# Patient Record
Sex: FEMALE | Employment: FULL TIME | URBAN - METROPOLITAN AREA
[De-identification: names, ages, dates, MRNs, and addresses within clinical notes are randomized per-mention and may not be internally consistent; named-entity substitution may affect disease eponyms.]

---

## 2018-06-05 ENCOUNTER — OFFICE VISIT (OUTPATIENT)
Dept: OBGYN CLINIC | Age: 48
End: 2018-06-05
Payer: MEDICARE

## 2018-06-05 ENCOUNTER — HOSPITAL ENCOUNTER (OUTPATIENT)
Age: 48
Setting detail: SPECIMEN
Discharge: HOME | End: 2018-06-05
Payer: MEDICARE

## 2018-06-05 VITALS
DIASTOLIC BLOOD PRESSURE: 74 MMHG | HEART RATE: 80 BPM | HEIGHT: 63 IN | WEIGHT: 118 LBS | SYSTOLIC BLOOD PRESSURE: 128 MMHG | BODY MASS INDEX: 20.91 KG/M2

## 2018-06-05 DIAGNOSIS — N93.9 ABNORMAL UTERINE BLEEDING: ICD-10-CM

## 2018-06-05 DIAGNOSIS — Z01.419 WOMEN'S ANNUAL ROUTINE GYNECOLOGICAL EXAMINATION: Primary | ICD-10-CM

## 2018-06-05 DIAGNOSIS — N89.8 VAGINAL ITCHING: ICD-10-CM

## 2018-06-05 DIAGNOSIS — Z12.31 ENCOUNTER FOR SCREENING MAMMOGRAM FOR BREAST CANCER: ICD-10-CM

## 2018-06-05 LAB
DIRECT EXAM: NORMAL
ESTRADIOL LEVEL: 78 PG/ML (ref 27–314)
FOLLICLE STIMULATING HORMONE: 13.6 U/L (ref 1.7–21.5)
Lab: NORMAL
PROLACTIN: 13.25 UG/L (ref 4.79–23.3)
SPECIMEN DESCRIPTION: NORMAL
STATUS: NORMAL
TSH SERPL DL<=0.05 MIU/L-ACNC: 1.6 MIU/L (ref 0.3–5)

## 2018-06-05 PROCEDURE — 99386 PREV VISIT NEW AGE 40-64: CPT | Performed by: OBSTETRICS & GYNECOLOGY

## 2018-06-05 ASSESSMENT — ENCOUNTER SYMPTOMS
BACK PAIN: 0
EYE PAIN: 0
ABDOMINAL PAIN: 0
SHORTNESS OF BREATH: 0
BLURRED VISION: 0
COUGH: 0

## 2018-06-07 LAB
HPV SAMPLE: ABNORMAL
HPV SOURCE: ABNORMAL
HPV, GENOTYPE 16: NOT DETECTED
HPV, GENOTYPE 18: NOT DETECTED
HPV, HIGH RISK OTHER: DETECTED
HPV, INTERPRETATION: ABNORMAL

## 2018-06-26 LAB — CYTOLOGY REPORT: NORMAL

## 2018-08-21 ENCOUNTER — HOSPITAL ENCOUNTER (OUTPATIENT)
Age: 48
Setting detail: SPECIMEN
Discharge: HOME | End: 2018-08-21
Payer: MEDICARE

## 2018-08-21 ENCOUNTER — TELEPHONE (OUTPATIENT)
Dept: OBGYN CLINIC | Age: 48
End: 2018-08-21

## 2018-08-21 LAB
-: NORMAL
AMORPHOUS: NORMAL
BACTERIA: NORMAL
BILIRUBIN URINE: NEGATIVE
CASTS UA: NORMAL /LPF (ref 0–8)
COLOR: YELLOW
COMMENT UA: ABNORMAL
CRYSTALS, UA: NORMAL /HPF
EPITHELIAL CELLS UA: NORMAL /HPF (ref 0–5)
GLUCOSE URINE: NEGATIVE
KETONES, URINE: NEGATIVE
LEUKOCYTE ESTERASE, URINE: NEGATIVE
MUCUS: NORMAL
NITRITE, URINE: NEGATIVE
OTHER OBSERVATIONS UA: NORMAL
PH UA: 5.5 (ref 5–8)
PROTEIN UA: NEGATIVE
RBC UA: NORMAL /HPF (ref 0–4)
RENAL EPITHELIAL, UA: NORMAL /HPF
SPECIFIC GRAVITY UA: 1.02 (ref 1–1.03)
TRICHOMONAS: NORMAL
TURBIDITY: CLEAR
URINE HGB: ABNORMAL
UROBILINOGEN, URINE: NORMAL
WBC UA: NORMAL /HPF (ref 0–5)
YEAST: NORMAL

## 2018-08-21 NOTE — TELEPHONE ENCOUNTER
Charisse Barnes with [facility] called requesting last office notes for the patient be faxed. Fax # [phone].

## 2018-09-13 LAB — CYTOLOGY REPORT: NORMAL

## 2024-04-03 ENCOUNTER — HOSPITAL ENCOUNTER (OUTPATIENT)
Age: 54
Setting detail: THERAPIES SERIES
Discharge: HOME | End: 2024-04-03
Payer: COMMERCIAL

## 2024-04-03 PROCEDURE — 97110 THERAPEUTIC EXERCISES: CPT

## 2024-04-03 PROCEDURE — 97161 PT EVAL LOW COMPLEX 20 MIN: CPT

## 2024-04-03 NOTE — CONSULTS
[x] [facility]  Outpatient Physical Therapy  [address] [facility] [address]  Phone: [phone]  Fax: [phone]       Physical Therapy Evaluation    Date:  4/3/2024  Patient: Skip Lynne  : 1970  MRN: [id number]  Physician: Og Reilly MD     Insurance: [employer]   Medical Diagnosis: Cervicalgia    Rehab Codes: M54.2   M25.60  Onset Date: 2024                                 Next 's appt: None Scheduled    Subjective:   HPI: Pt reports insidious onset of Lower Cervical Pain and Stiffness 2 months ago. She states her symptoms present about a half hour after starting work and worsen through the day. She states the pain is located In her Lower Cervical Spine. She denies UE pain or numbness/tingling. She denies UE weakness. She denies effect with coughing or sneezing. She works as a  8 hours per day 6 days per week. She states she spends most of the day looking down.     PMHx/Comorbidities:  [x] refer to full medical chart in [facility] [] Unremarkable    [] Pacemaker [] Cancer [] Arthritis   [] MI/Heart Problems [] Diabetes [] HTN   [] Obesity [] Dialysis  [] Other:   [] Asthma/COPD [] Dementia [] Other:   [] Stroke [] Sleep apnea [] Other:   [] Vascular disease [] Rheumatic disease [] Other:     Medications: [x] Refer to full medical record [x] None   Allergies:      [x] Refer to full medical record  [x] None     Tests: [x] X-Ray: Cervical Degenerative Disc Disease per patient.     Function:  Pt denies difficulty accessing all levels of her home.  Patient lives with:  Lives with 12 year old and 17 year old children   In what type of home []  One story   [] Two story   [] Split level   Number of stairs to enter    With handrail on the []  Right to enter   [] Left to enter   Bathroom has a []  Tub only  [] Tub/shower combo   [] Walk in shower    []  Grab bars   Washing machine is on []  Main level   [] Second level   [] Basement     ADL/IADL [x] Previously independent with all

## 2024-04-08 ENCOUNTER — HOSPITAL ENCOUNTER (OUTPATIENT)
Age: 54
Setting detail: THERAPIES SERIES
Discharge: HOME | End: 2024-04-08
Payer: COMMERCIAL

## 2024-04-08 NOTE — FLOWSHEET NOTE
[x] [facility]  Outpatient Rehabilitation &  Therapy  [address] [place] [address]  [phone]  [phone]              Therapy Cancel/No Show note    Date: 2024  Patient: Skip Lynne  : 1970  MRN: [id number]    Cancels/No Shows to date:     For today's appointment patient:    [x]  Cancelled    [] Rescheduled appointment    [] No-show     Reason given by patient:    []  Patient ill    []  Conflicting appointment    [] No transportation      [] Conflict with work    [] No reason given    [] Weather related    [] COVID-19    [x] Other:      Comments:  Canceled secondary to still awaiting Insurance Approval.      [x] Next appointment was confirmed    Electronically signed by: Francisco Rouse, PT

## 2024-04-10 ENCOUNTER — HOSPITAL ENCOUNTER (OUTPATIENT)
Age: 54
Setting detail: THERAPIES SERIES
Discharge: HOME | End: 2024-04-10
Payer: COMMERCIAL

## 2024-04-10 PROCEDURE — 97110 THERAPEUTIC EXERCISES: CPT

## 2024-04-10 PROCEDURE — 97140 MANUAL THERAPY 1/> REGIONS: CPT

## 2024-04-10 NOTE — FLOWSHEET NOTE
[x] [facility]  Outpatient Rehabilitation &  Therapy  [address] [place] [address]  [phone]  F: [phone]             Physical Therapy Daily Treatment Note    Date:  4/10/2024  Patient Name:  Skip Lynne    :  1970  MRN: [id number]  Physician: Og Reilly MD        Insurance: [employer]   Medical Diagnosis: Cervicalgia                      Rehab Codes: M54.2   M25.60  Onset Date: 2024                                 Next 's appt: None Scheduled  Visit# / total visits: ; Progress note for patient due at visit 12     Cancels/No Shows: 0/0    Subjective:  Pt reports no adverse response to previous Therapy session. She states the medicine prescribed by the referring, Cyclobenzaprine, does improve her pain but does make her sleepy. She states she has been performing her HEP as instructed and attempting to correct her posture while working.   Pain:  [] Yes  [x] No Location:  N/A  Pain Rating: (0-10 scale) 0/10  Pain altered Tx:  [] No  [] Yes  Action:  Comments:    Objective:   Standing Posture: Decreased Cervical Lordosis. Right Scapula Elevated and Protracted.  Cervical AROM: Flexion 50 degrees with Lower Cervical \"tightness\", Extension 50 degrees with \"soreness\", Lateral Flexion Right 30 degrees with \"tightness\" and \"soreness\" on Left and Left 30 degrees with \"tightness\" and \"soreness\" on Right, and Rotation Right 65 degrees with \"tightness\" and \"soreness\" and Left 65 degrees with \"tightness\" and \"soreness\". Stiffness noted in Lower Cervical Spine vs Upper Cervical Spine with movements.  Loaded Cervical Retractions 3x10 Reps with no effect.  Cervical Passive ROM: Lateral Flexion Right with pain initially in Lower Cervical Spine Left with pain resolving as technique was continued and Left with no effect, Rotation Right and Left with no effect.  Manual Cervical Traction x5 Minutes with no effect.  Unloaded Cervical Retractions 3x10 Reps Manually with Head off the

## 2024-04-12 ENCOUNTER — HOSPITAL ENCOUNTER (OUTPATIENT)
Age: 54
Setting detail: THERAPIES SERIES
Discharge: HOME | End: 2024-04-12
Payer: COMMERCIAL

## 2024-04-12 PROCEDURE — 97140 MANUAL THERAPY 1/> REGIONS: CPT

## 2024-04-12 PROCEDURE — 97110 THERAPEUTIC EXERCISES: CPT

## 2024-04-12 NOTE — FLOWSHEET NOTE
[x] [facility]  Outpatient Rehabilitation &  Therapy  [address] [place] [address]  [phone]  F: [phone]             Physical Therapy Daily Treatment Note    Date:  2024  Patient Name:  Skip Lynne    :  1970  MRN: [id number]  Physician: Og Reilly MD        Insurance: [employer]   Medical Diagnosis: Cervicalgia                      Rehab Codes: M54.2   M25.60  Onset Date: 2024                                 Next 's appt: None Scheduled  Visit# / total visits: ; Progress note for patient due at visit 12     Cancels/No Shows: 0/0    Subjective:  Pt reports no adverse response to previous Therapy session. She states she is taking the Cyclobenzaprine before bedtime. She states she \"felt good\" for the remainder of the day. She states yesterday she worked all day with increased pain noted as a result. She states her neck \"hurts\" this morning also. She states she has been performing her HEP as instructed and attempting to correct her posture while working.   Pain:  [] Yes  [x] No Location:  Central Cervical Spine  Pain Rating: (0-10 scale) 5/10  Pain altered Tx:  [x] No  [] Yes  Action: As Below  Comments:    Objective:   Standing Posture: Decreased Cervical Lordosis. Right Scapula Elevated and Protracted.  Cervical AROM: Flexion 50 degrees with Lower Cervical \"tightness\", Extension 50 degrees with \"soreness\", Lateral Flexion Right 30 degrees with \"tightness\" and \"soreness\" on Left and Left 30 degrees with \"tightness\" and \"soreness\" on Right, and Rotation Right 65 degrees with \"tightness\" and \"soreness\" and Left 65 degrees with \"tightness\" and \"soreness\". Stiffness noted in Lower Cervical Spine vs Upper Cervical Spine with movements.  Loaded Cervical Retractions 3x10 Reps with no effect.  Cervical Passive ROM: Lateral Flexion Right with pain initially in Lower Cervical Spine Left with pain resolving as technique was continued and Left with no effect, Rotation

## 2024-04-15 ENCOUNTER — HOSPITAL ENCOUNTER (OUTPATIENT)
Age: 54
Setting detail: THERAPIES SERIES
Discharge: HOME | End: 2024-04-15
Payer: COMMERCIAL

## 2024-04-15 PROCEDURE — 97140 MANUAL THERAPY 1/> REGIONS: CPT

## 2024-04-15 PROCEDURE — 97110 THERAPEUTIC EXERCISES: CPT

## 2024-04-15 NOTE — FLOWSHEET NOTE
[x] Verbal  [x] Demo  [x] Written  Comprehension of Education:  [x] Verbalizes understanding.  [x] Demonstrates understanding.  [x] Needs Review.  [] Demonstrates/verbalizes understanding of HEP/Ed previously given.    Plan: [x] Continue current frequency toward long and short term goals.    [x] Specific Instructions for subsequent treatments: Progress Manual Therapy and Cervical Retractions. Review Postural Corrections.      Time In:0715            Time Out: 0800    Electronically signed by:  Francisco Rouse PT

## 2024-04-17 ENCOUNTER — HOSPITAL ENCOUNTER (OUTPATIENT)
Age: 54
Setting detail: THERAPIES SERIES
Discharge: HOME | End: 2024-04-17
Payer: COMMERCIAL

## 2024-04-17 PROCEDURE — 97110 THERAPEUTIC EXERCISES: CPT

## 2024-04-17 PROCEDURE — 97140 MANUAL THERAPY 1/> REGIONS: CPT

## 2024-04-17 NOTE — FLOWSHEET NOTE
[x] [facility]  Outpatient Rehabilitation &  Therapy  [address] [place] [address]  [phone]  F: [phone]             Physical Therapy Daily Treatment Note    Date:  2024  Patient Name:  Skip Lynne    :  1970  MRN: [id number]  Physician: Og Reilly MD        Insurance: [employer]   Medical Diagnosis: Cervicalgia                      Rehab Codes: M54.2   M25.60  Onset Date: 2024                                 Next 's appt: None Scheduled  Visit# / total visits: ; Progress note for patient due at visit 12     Cancels/No Shows: 0/0    Subjective:  Pt reports no adverse response to previous Therapy session. She states she \"felt better\" for the remainder of the day. She notes no pain this morning only \"tightness\". She states she was able to work yesterday without pain only \"tightness\".  Pain:  [] Yes  [x] No Location:  Central Cervical Spine  Pain Rating: (0-10 scale) 0/10  Pain altered Tx:  [x] No  [] Yes  Action: As Below  Comments:    Objective:   Standing Posture: Decreased Cervical Lordosis. Right Scapula Elevated and Protracted.  Cervical AROM: Flexion 50 degrees with Lower Cervical \"tightness\", Extension 50 degrees with \"soreness\", Lateral Flexion Right 30 degrees with \"tightness\" and \"soreness\" on Left and Left 30 degrees with \"tightness\" and \"soreness\" on Right, and Rotation Right 65 degrees with \"tightness\" and \"soreness\" and Left 65 degrees with \"tightness\" and \"soreness\". Stiffness noted in Lower Cervical Spine vs Upper Cervical Spine with movements.  Loaded Cervical Retractions 3x10 Reps with no effect.  Cervical Passive ROM: Lateral Flexion Right with pain initially in Lower Cervical Spine Left with pain resolving as technique was continued and Left with no effect, Rotation Right and Left with no effect.  Manual Cervical Traction x5 Minutes with no effect.  Unloaded Cervical Retractions 3x10 Reps Manually with Head off the plinth.   Grade

## 2024-04-19 ENCOUNTER — HOSPITAL ENCOUNTER (OUTPATIENT)
Age: 54
Setting detail: THERAPIES SERIES
Discharge: HOME | End: 2024-04-19
Payer: COMMERCIAL

## 2024-04-19 PROCEDURE — 97140 MANUAL THERAPY 1/> REGIONS: CPT

## 2024-04-19 PROCEDURE — 97110 THERAPEUTIC EXERCISES: CPT

## 2024-04-19 NOTE — FLOWSHEET NOTE
with pt reporting pain at C3-C5.  NPDIQ Score: 10/50  Above recorded at Initial Evaluation on April 03, 2024.     Modalities: None  Precautions: None  Exercises:  Exercise Reps/ Time Weight/ Level Comments   Towel Stretch For Rotation  3x5 Seconds           Loaded Cervical Retractions  3x10    Manual   No Pain              C2-C6 And C1 UPAs  3x15 Seconds  Grade IV  No Pain And No Change In \"Tightness\" With AROM             Passive Cervical Lateral Flexion  3x15 Seconds Each  Grade IV  No Pain  ROM Less Tight Afterwards             Stretch To Cervical Lateral Flexors  3x10 Seconds Each           Passive Cervical Rotation  3x15 Seconds Each  Grade IV  No Pain  ROM Less Tight Afterwards             Cervical Isometrics  5x10 Seconds           Unloaded Cervical Retractions Manually                   Manual Cervical Traction  8 Minutes    No Pain  Improved AROM Afterwards             STM Bilat Cervical Paraspinals  5 Minutes     Other:      Treatment Charges: Mins Units   []  Modalities     [x]  Ther Exercise 15 1   [x]  Manual Therapy 30 2   []  Ther Activities     []  Neuro Re-ed     []  Vasocompression     [] Gait     []  Other     Total Billable time 3 45       Assessment: Pt presents reporting improved symptoms for the remainder of the day after treatment. She reports no pain this morning only tightness. She states she did experience significant \"tightness\" yesterday while working secondary to not having the time to perform her HEP. No Goals Met.    STG: (to be met in 12 treatments)  Improve Lower Cervical Pain to no greater than 2/10 to facilitate Looking Down and Working.  Improve Pt's ability to work with pt reporting she is able to work for one hour before onset of her Lower Cervical Pain.  Improve Seated Postural Awareness to improve pt's ability to work without pain.  Independent with her Home Exercise Program.     LTG: (to be met in 24 treatments)  Improve NPDIQ Score from 10/50 to 05/50 to facilitate

## 2024-04-24 ENCOUNTER — HOSPITAL ENCOUNTER (OUTPATIENT)
Age: 54
Setting detail: THERAPIES SERIES
Discharge: HOME | End: 2024-04-24
Payer: COMMERCIAL

## 2024-04-24 PROCEDURE — 97140 MANUAL THERAPY 1/> REGIONS: CPT

## 2024-04-24 PROCEDURE — 97110 THERAPEUTIC EXERCISES: CPT

## 2024-04-24 NOTE — FLOWSHEET NOTE
[x] [facility]  Outpatient Rehabilitation &  Therapy  [address] [place] [address]  [phone]  F: [phone]             Physical Therapy Daily Treatment Note    Date:  2024  Patient Name:  Skip Lynne    :  1970  MRN: [id number]  Physician: Og Reilly MD        Insurance: [employer]   Medical Diagnosis: Cervicalgia                      Rehab Codes: M54.2   M25.60  Onset Date: 2024                                 Next 's appt: None Scheduled  Visit# / total visits: ; Progress note for patient due at visit 12     Cancels/No Shows: 0/0    Subjective:  Pt reports no adverse response to previous Therapy session. She states she exercises a lot yesterday with decreased \"tightness\" noted this morning. She notes increased tightness and pain with Rotation Right > Left this morning. She states she did not perform her new Towel Stretch given the previous session secondary to not understanding how to do it correctly.  Pain:  [] Yes  [x] No Location:  Central Cervical Spine  Pain Rating: (0-10 scale) 0/10  Pain altered Tx:  [x] No  [] Yes  Action: As Below  Comments:    Objective:   Standing Posture: Decreased Cervical Lordosis. Right Scapula Elevated and Protracted.  Cervical AROM: Flexion 50 degrees with Lower Cervical \"tightness\", Extension 50 degrees with \"soreness\", Lateral Flexion Right 30 degrees with \"tightness\" and \"soreness\" on Left and Left 30 degrees with \"tightness\" and \"soreness\" on Right, and Rotation Right 65 degrees with \"tightness\" and \"soreness\" and Left 65 degrees with \"tightness\" and \"soreness\". Stiffness noted in Lower Cervical Spine vs Upper Cervical Spine with movements.  Loaded Cervical Retractions 3x10 Reps with no effect.  Cervical Passive ROM: Lateral Flexion Right with pain initially in Lower Cervical Spine Left with pain resolving as technique was continued and Left with no effect, Rotation Right and Left with no effect.  Manual Cervical

## 2024-04-29 ENCOUNTER — HOSPITAL ENCOUNTER (OUTPATIENT)
Age: 54
Setting detail: THERAPIES SERIES
Discharge: HOME | End: 2024-04-29
Payer: COMMERCIAL

## 2024-04-29 PROCEDURE — 97140 MANUAL THERAPY 1/> REGIONS: CPT

## 2024-04-29 PROCEDURE — 97110 THERAPEUTIC EXERCISES: CPT

## 2024-04-29 NOTE — FLOWSHEET NOTE
in 24 treatments)  Improve NPDIQ Score from 10/50 to 05/50 to facilitate Looking Down and Working.    Pt. Education:  [x] Plans/Goals, Risks/Benefits discussed  [x] Home exercise program: Reviewed per [employer] #WQADHCQA Add overpressure to Lateral Flexion and Rotation ROM. Add per [employer] #WQADHCQA. #[id number]. Reviewed Isometric Exercise with pt.  Method of Education: [x] Verbal  [x] Demo  [x] Written  Comprehension of Education:  [x] Verbalizes understanding.  [x] Demonstrates understanding.  [x] Needs Review.  [] Demonstrates/verbalizes understanding of HEP/Ed previously given.    Plan: [x] Continue current frequency toward long and short term goals.    [x] Specific Instructions for subsequent treatments: Progress Cervical Isometrics.      Time In:0800            Time Out: 0845    Electronically signed by:  Francisco Rouse PT

## 2024-05-01 ENCOUNTER — HOSPITAL ENCOUNTER (OUTPATIENT)
Age: 54
Setting detail: THERAPIES SERIES
Discharge: HOME | End: 2024-05-01
Payer: COMMERCIAL

## 2024-05-01 PROCEDURE — 97140 MANUAL THERAPY 1/> REGIONS: CPT

## 2024-05-01 PROCEDURE — 97110 THERAPEUTIC EXERCISES: CPT

## 2024-05-01 NOTE — FLOWSHEET NOTE
[x] [facility]  Outpatient Rehabilitation &  Therapy  [address] [place] [address]  [phone]  F: [phone]             Physical Therapy Daily Treatment Note    Date:  2024  Patient Name:  Skip Lynne    :  1970  MRN: [id number]  Physician: Og Reilly MD        Insurance: [employer]   Medical Diagnosis: Cervicalgia                      Rehab Codes: M54.2   M25.60  Onset Date: 2024                                 Next 's appt: None Scheduled  Visit# / total visits: ; Progress note for patient due at visit 12     Cancels/No Shows: 0/0    Subjective:  Pt reports no adverse response to previous Therapy session. She states she did perform the Isometric Exercise as directed and notes she was shaking secondary to fatigue. She notes no pain this morning only \"tightness\".  Pain:  [] Yes  [x] No Location:  Central Cervical Spine  Pain Rating: (0-10 scale) 0/10  Pain altered Tx:  [x] No  [] Yes  Action: As Below  Comments:    Objective:   Standing Posture: Decreased Cervical Lordosis. Right Scapula Elevated and Protracted.  Cervical AROM: Flexion 50 degrees with Lower Cervical \"tightness\", Extension 50 degrees with \"soreness\", Lateral Flexion Right 30 degrees with \"tightness\" and \"soreness\" on Left and Left 30 degrees with \"tightness\" and \"soreness\" on Right, and Rotation Right 65 degrees with \"tightness\" and \"soreness\" and Left 65 degrees with \"tightness\" and \"soreness\". Stiffness noted in Lower Cervical Spine vs Upper Cervical Spine with movements.  Loaded Cervical Retractions 3x10 Reps with no effect.  Cervical Passive ROM: Lateral Flexion Right with pain initially in Lower Cervical Spine Left with pain resolving as technique was continued and Left with no effect, Rotation Right and Left with no effect.  Manual Cervical Traction x5 Minutes with no effect.  Unloaded Cervical Retractions 3x10 Reps Manually with Head off the plinth.   Grade IV x10 Seconds Each

## 2024-05-03 ENCOUNTER — HOSPITAL ENCOUNTER (OUTPATIENT)
Age: 54
Setting detail: THERAPIES SERIES
Discharge: HOME | End: 2024-05-03
Payer: COMMERCIAL

## 2024-05-03 PROCEDURE — 97140 MANUAL THERAPY 1/> REGIONS: CPT

## 2024-05-03 PROCEDURE — 97110 THERAPEUTIC EXERCISES: CPT

## 2024-05-03 NOTE — FLOWSHEET NOTE
[x] [facility]  Outpatient Rehabilitation &  Therapy  [address] [place] [address]  [phone]  F: [phone]             Physical Therapy Daily Treatment Note    Date:  5/3/2024  Patient Name:  Skip Lynne    :  1970  MRN: [id number]  Physician: Og Reilly MD        Insurance: [employer]   Medical Diagnosis: Cervicalgia                      Rehab Codes: M54.2   M25.60  Onset Date: 2024                                 Next 's appt: None Scheduled  Visit# / total visits: 10/12; Progress note for patient due at visit 12     Cancels/No Shows: 0/0    Subjective:  Pt reports no adverse response to previous Therapy session. She states she felt good following Therapy, however notes her neck was \"tight\" by the end of the day.  Pain:  [] Yes  [x] No Location:  Central Cervical Spine  Pain Rating: (0-10 scale) 0/10  Pain altered Tx:  [x] No  [] Yes  Action: As Below  Comments:    Objective:   Standing Posture: Decreased Cervical Lordosis. Right Scapula Elevated and Protracted.  Cervical AROM: Flexion 50 degrees with Lower Cervical \"tightness\", Extension 50 degrees with \"soreness\", Lateral Flexion Right 30 degrees with \"tightness\" and \"soreness\" on Left and Left 30 degrees with \"tightness\" and \"soreness\" on Right, and Rotation Right 65 degrees with \"tightness\" and \"soreness\" and Left 65 degrees with \"tightness\" and \"soreness\". Stiffness noted in Lower Cervical Spine vs Upper Cervical Spine with movements.  Loaded Cervical Retractions 3x10 Reps with no effect.  Cervical Passive ROM: Lateral Flexion Right with pain initially in Lower Cervical Spine Left with pain resolving as technique was continued and Left with no effect, Rotation Right and Left with no effect.  Manual Cervical Traction x5 Minutes with no effect.  Unloaded Cervical Retractions 3x10 Reps Manually with Head off the plinth.   Grade IV x10 Seconds Each C2-C6 with pt reporting pain at C3-C5.  NPDIQ Score:

## 2024-05-06 ENCOUNTER — HOSPITAL ENCOUNTER (OUTPATIENT)
Age: 54
Setting detail: THERAPIES SERIES
Discharge: HOME | End: 2024-05-06
Payer: COMMERCIAL

## 2024-05-06 PROCEDURE — 97110 THERAPEUTIC EXERCISES: CPT

## 2024-05-06 PROCEDURE — 97140 MANUAL THERAPY 1/> REGIONS: CPT

## 2024-05-06 NOTE — FLOWSHEET NOTE
[x] [facility]  Outpatient Rehabilitation &  Therapy  [address] [place] [address]  [phone]  F: [phone]             Physical Therapy Daily Treatment Note    Date:  2024  Patient Name:  Skip Lynne    :  1970  MRN: [id number]  Physician: Og Reilly MD        Insurance: [employer]   Medical Diagnosis: Cervicalgia                      Rehab Codes: M54.2   M25.60  Onset Date: 2024                                 Next 's appt: None Scheduled  Visit# / total visits: ; Progress note for patient due at visit 12     Cancels/No Shows: 0/0    Subjective:  Pt reports no adverse response to previous Therapy session. She states she drove for two hours yesterday with her Head feeling \"heavy\". She states she went home a layed down and felt better. She states her neck feels \"weak\".  Pain:  [] Yes  [x] No Location:  Central Cervical Spine  Pain Rating: (0-10 scale) 0/10  Pain altered Tx:  [x] No  [] Yes  Action: As Below  Comments:    Objective:   Standing Posture: Decreased Cervical Lordosis. Right Scapula Elevated and Protracted.  Cervical AROM: Flexion 50 degrees with Lower Cervical \"tightness\", Extension 50 degrees with \"soreness\", Lateral Flexion Right 30 degrees with \"tightness\" and \"soreness\" on Left and Left 30 degrees with \"tightness\" and \"soreness\" on Right, and Rotation Right 65 degrees with \"tightness\" and \"soreness\" and Left 65 degrees with \"tightness\" and \"soreness\". Stiffness noted in Lower Cervical Spine vs Upper Cervical Spine with movements.  Loaded Cervical Retractions 3x10 Reps with no effect.  Cervical Passive ROM: Lateral Flexion Right with pain initially in Lower Cervical Spine Left with pain resolving as technique was continued and Left with no effect, Rotation Right and Left with no effect.  Manual Cervical Traction x5 Minutes with no effect.  Unloaded Cervical Retractions 3x10 Reps Manually with Head off the plinth.   Grade IV x10 Seconds

## 2024-05-08 ENCOUNTER — HOSPITAL ENCOUNTER (OUTPATIENT)
Age: 54
Setting detail: THERAPIES SERIES
Discharge: HOME | End: 2024-05-08
Payer: COMMERCIAL

## 2024-05-08 PROCEDURE — 97110 THERAPEUTIC EXERCISES: CPT

## 2024-05-08 PROCEDURE — 97140 MANUAL THERAPY 1/> REGIONS: CPT

## 2024-05-08 NOTE — FLOWSHEET NOTE
[x] [facility]  Outpatient Rehabilitation &  Therapy  [address] [place] [address]  [phone]  F: [phone]             Physical Therapy Daily Treatment Note/Discharge Note    Date:  2024  Patient Name:  Skip Lynne    :  1970  MRN: [id number]  Physician: Og Reilly MD        Insurance: [employer]   Medical Diagnosis: Cervicalgia                      Rehab Codes: M54.2   M25.60  Onset Date: 2024                                 Next 's appt: None Scheduled  Visit# / total visits: ; Progress note for patient due at visit 12     Cancels/No Shows: 0/0    Subjective:  Pt reports no adverse response to previous Therapy session. She states she had no pain or \"tightness\" until afternoon. She states she continues to note heaviness and tightness the longer the day goes on. She states her symptoms are worse on work days secondary to sitting in one position all day, although notes symptoms even on her days off. She does present this date with Cervical x-rays and an MRI of her Lumbar Spine.  Pain:  [] Yes  [x] No Location:  Central Cervical Spine  Pain Rating: (0-10 scale) 0/10  Pain altered Tx:  [x] No  [] Yes  Action: As Below  Comments:    Objective:   Standing Posture: Decreased Cervical Lordosis. Right Scapula Elevated and Protracted.  Cervical AROM: Flexion 50 degrees with Lower Cervical \"tightness\", Extension 50 degrees with \"soreness\", Lateral Flexion Right 30 degrees with \"tightness\" and \"soreness\" on Left and Left 30 degrees with \"tightness\" and \"soreness\" on Right, and Rotation Right 65 degrees with \"tightness\" and \"soreness\" and Left 65 degrees with \"tightness\" and \"soreness\". Stiffness noted in Lower Cervical Spine vs Upper Cervical Spine with movements.  Loaded Cervical Retractions 3x10 Reps with no effect.  Cervical Passive ROM: Lateral Flexion Right with pain initially in Lower Cervical Spine Left with pain resolving as technique was continued and Left

## 2024-05-10 ENCOUNTER — APPOINTMENT (OUTPATIENT)
Age: 54
End: 2024-05-10
Payer: COMMERCIAL

## 2025-04-23 ENCOUNTER — HOSPITAL ENCOUNTER (OUTPATIENT)
Dept: GENERAL RADIOLOGY | Age: 55
Discharge: HOME | End: 2025-04-25
Payer: COMMERCIAL

## 2025-04-23 ENCOUNTER — OFFICE VISIT (OUTPATIENT)
Dept: NEUROSURGERY | Age: 55
End: 2025-04-23
Payer: COMMERCIAL

## 2025-04-23 VITALS
HEART RATE: 74 BPM | DIASTOLIC BLOOD PRESSURE: 89 MMHG | BODY MASS INDEX: 20.38 KG/M2 | SYSTOLIC BLOOD PRESSURE: 137 MMHG | WEIGHT: 115 LBS | HEIGHT: 63 IN

## 2025-04-23 DIAGNOSIS — M47.816 LUMBAR SPONDYLOSIS: ICD-10-CM

## 2025-04-23 DIAGNOSIS — Z78.0 POST-MENOPAUSAL: Primary | ICD-10-CM

## 2025-04-23 DIAGNOSIS — M47.812 CERVICAL SPONDYLOSIS: ICD-10-CM

## 2025-04-23 PROCEDURE — G8420 CALC BMI NORM PARAMETERS: HCPCS

## 2025-04-23 PROCEDURE — 72050 X-RAY EXAM NECK SPINE 4/5VWS: CPT

## 2025-04-23 PROCEDURE — G8427 DOCREV CUR MEDS BY ELIG CLIN: HCPCS

## 2025-04-23 PROCEDURE — 3017F COLORECTAL CA SCREEN DOC REV: CPT

## 2025-04-23 PROCEDURE — 72110 X-RAY EXAM L-2 SPINE 4/>VWS: CPT

## 2025-04-23 PROCEDURE — 99204 OFFICE O/P NEW MOD 45 MIN: CPT

## 2025-04-23 PROCEDURE — 1036F TOBACCO NON-USER: CPT

## 2025-04-23 RX ORDER — VALACYCLOVIR HYDROCHLORIDE 1 G/1
TABLET, FILM COATED ORAL
COMMUNITY
Start: 2025-03-19

## 2025-04-23 RX ORDER — MULTIVITAMIN
1 TABLET ORAL DAILY
COMMUNITY

## 2025-04-23 RX ORDER — MELOXICAM 7.5 MG/1
7.5 TABLET ORAL DAILY
COMMUNITY

## 2025-04-23 RX ORDER — LIDOCAINE 50 MG/G
1 PATCH TOPICAL DAILY
COMMUNITY

## 2025-04-23 RX ORDER — ACETAMINOPHEN 325 MG/1
650 TABLET ORAL 3 TIMES DAILY
COMMUNITY

## 2025-04-23 NOTE — PROGRESS NOTES
[facility] NEUROSURGERY [place] [id number] [facility]  [id number]  [address] GROUND FLOOR, [address]  [facility] [address]  Dept: [phone]    Patient:  Skip Lynne  YOB: 1970  Date: 4/23/25    The patient is a 55 y.o. female who presents today for consult of the following problems:     Chief Complaint   Patient presents with    New Patient     Neck pain  Upper back pain             HPI:     Skip Lynne is a 55 y.o. female on whom neurosurgical consultation was requested by No primary care provider on file. for management of neck and back pain.    The patient is a 55-year-old female presenting with neck and back pain that began a few years ago and has progressively worsened. Pain is located posteriorly, involving the bilateral shoulders and lower back. It is constant, rated 7/10 in severity, and described as a tight sensation. Pain is aggravated by daily activities and alleviated somewhat by using a massage gun, massage therapy, and applying pressure through a pillow or lumbar support brace. She completed physical therapy in May 2024 and recently participated in additional therapy while in [place], although oral steroids and OTC NSAIDs have provided minimal relief. She reports hearing cracking and grinding in her neck with range of motion but denies weakness. She denies bladder or bowel incontinence. Exercise and yoga are performed daily for symptom management.     Completed physical therapy May 2024    History:     History reviewed. No pertinent past medical history.  Past Surgical History:   Procedure Laterality Date    TUBAL LIGATION       History reviewed. No pertinent family history.  Current Outpatient Medications on File Prior to Visit   Medication Sig Dispense Refill    acetaminophen (TYLENOL) 325 MG tablet Take 2 tablets by mouth 3 times daily      lidocaine (LIDODERM) 5 % Apply 1 patch topically daily      LYCOPENE PO Take 1 tablet by mouth

## 2025-04-25 ENCOUNTER — TELEPHONE (OUTPATIENT)
Dept: PAIN MANAGEMENT | Age: 55
End: 2025-04-25

## 2025-05-19 ENCOUNTER — HOSPITAL ENCOUNTER (OUTPATIENT)
Dept: MAMMOGRAPHY | Age: 55
Discharge: HOME | End: 2025-05-21
Payer: COMMERCIAL

## 2025-05-19 DIAGNOSIS — Z78.0 POST-MENOPAUSAL: ICD-10-CM

## 2025-05-19 PROCEDURE — 77080 DXA BONE DENSITY AXIAL: CPT

## 2025-06-06 ENCOUNTER — INITIAL CONSULT (OUTPATIENT)
Dept: PAIN MANAGEMENT | Age: 55
End: 2025-06-06
Payer: COMMERCIAL

## 2025-06-06 VITALS — HEIGHT: 63 IN | BODY MASS INDEX: 20.38 KG/M2 | WEIGHT: 115 LBS

## 2025-06-06 DIAGNOSIS — M79.18 MYOFASCIAL PAIN SYNDROME: ICD-10-CM

## 2025-06-06 DIAGNOSIS — M47.817 LUMBOSACRAL SPONDYLOSIS WITHOUT MYELOPATHY: Primary | ICD-10-CM

## 2025-06-06 DIAGNOSIS — M51.369 DEGENERATION OF INTERVERTEBRAL DISC OF LUMBAR REGION, UNSPECIFIED WHETHER PAIN PRESENT: ICD-10-CM

## 2025-06-06 DIAGNOSIS — M47.812 CERVICAL SPONDYLOSIS: ICD-10-CM

## 2025-06-06 PROCEDURE — 3017F COLORECTAL CA SCREEN DOC REV: CPT | Performed by: STUDENT IN AN ORGANIZED HEALTH CARE EDUCATION/TRAINING PROGRAM

## 2025-06-06 PROCEDURE — 1036F TOBACCO NON-USER: CPT | Performed by: STUDENT IN AN ORGANIZED HEALTH CARE EDUCATION/TRAINING PROGRAM

## 2025-06-06 PROCEDURE — G8420 CALC BMI NORM PARAMETERS: HCPCS | Performed by: STUDENT IN AN ORGANIZED HEALTH CARE EDUCATION/TRAINING PROGRAM

## 2025-06-06 PROCEDURE — 99204 OFFICE O/P NEW MOD 45 MIN: CPT | Performed by: STUDENT IN AN ORGANIZED HEALTH CARE EDUCATION/TRAINING PROGRAM

## 2025-06-06 PROCEDURE — G8427 DOCREV CUR MEDS BY ELIG CLIN: HCPCS | Performed by: STUDENT IN AN ORGANIZED HEALTH CARE EDUCATION/TRAINING PROGRAM

## 2025-06-06 RX ORDER — LIDOCAINE 4 G/100G
PATCH TOPICAL
COMMUNITY
Start: 2025-05-02

## 2025-06-06 NOTE — H&P (VIEW-ONLY)
Chronic Pain Clinic Note     Encounter Date: 6/6/2025     SUBJECTIVE:  Chief Complaint   Patient presents with    Consultation     Referred by Marisa Cuevas APRN - CNP    Back Pain    Neck Pain       History of Present Illness:   Skip Lynne is a 55 y.o. female who presents with Neck and low back pain    Medication Refill: N/A    Current Complaints of Pain:   Location: Neck and low back  Radiation: None  Severity: Moderate  Pain Numerical Score - 6   Average: 5     Highest: 8  Lowest: 1  Character/Quality: Complains of pain that is aching  Timing: Constant  Associated symptoms: none  Numbness: None  Weakness: None  Exacerbating factors: Sitting, standing, positional  Alleviating factors: Yoga  Length of time pain has been present: Started 2 years ago   Inciting event/injury: None  Bowel/Bladder incontinence: None  Falls: None  Physical Therapy: April 2025    History of Interventions:   Surgery: No previous lumbar/cervical surgeries  Injections: None    Imaging:    Lumbar MRI April 2025 in [facility], independently reviewed images with patient in room we will obtain report and interpret report which is in Faroese    Cervical MRI 3/10/2025    History reviewed. No pertinent past medical history.    Past Surgical History:   Procedure Laterality Date    TUBAL LIGATION         History reviewed. No pertinent family history.    Social History     Socioeconomic History    Marital status: Single     Spouse name: Not on file    Number of children: Not on file    Years of education: Not on file    Highest education level: Not on file   Occupational History    Not on file   Tobacco Use    Smoking status: Never    Smokeless tobacco: Never   Vaping Use    Vaping status: Never Used   Substance and Sexual Activity    Alcohol use: No    Drug use: No    Sexual activity: Yes     Partners: Male   Other Topics Concern    Not on file   Social History Narrative    Not on file     Social Drivers of Health     Financial Resource

## 2025-06-06 NOTE — PROGRESS NOTES
Chronic Pain Clinic Note     Encounter Date: 6/6/2025     SUBJECTIVE:  Chief Complaint   Patient presents with    Consultation     Referred by Marisa Cuevas APRN - CNP    Back Pain    Neck Pain       History of Present Illness:   Skip Lynne is a 55 y.o. female who presents with Neck and low back pain    Medication Refill: N/A    Current Complaints of Pain:   Location: Neck and low back  Radiation: None  Severity: Moderate  Pain Numerical Score - 6   Average: 5     Highest: 8  Lowest: 1  Character/Quality: Complains of pain that is aching  Timing: Constant  Associated symptoms: none  Numbness: None  Weakness: None  Exacerbating factors: Sitting, standing, positional  Alleviating factors: Yoga  Length of time pain has been present: Started 2 years ago   Inciting event/injury: None  Bowel/Bladder incontinence: None  Falls: None  Physical Therapy: April 2025    History of Interventions:   Surgery: No previous lumbar/cervical surgeries  Injections: None    Imaging:    Lumbar MRI April 2025 in [facility], independently reviewed images with patient in room we will obtain report and interpret report which is in [place]    Cervical MRI 3/10/2025    History reviewed. No pertinent past medical history.    Past Surgical History:   Procedure Laterality Date    TUBAL LIGATION         History reviewed. No pertinent family history.    Social History     Socioeconomic History    Marital status: Single     Spouse name: Not on file    Number of children: Not on file    Years of education: Not on file    Highest education level: Not on file   Occupational History    Not on file   Tobacco Use    Smoking status: Never    Smokeless tobacco: Never   Vaping Use    Vaping status: Never Used   Substance and Sexual Activity    Alcohol use: No    Drug use: No    Sexual activity: Yes     Partners: Male   Other Topics Concern    Not on file   Social History Narrative    Not on file     Social Drivers of Health     Financial Resource

## 2025-07-01 ENCOUNTER — HOSPITAL ENCOUNTER (OUTPATIENT)
Dept: PAIN MANAGEMENT | Facility: CLINIC | Age: 55
Discharge: HOME | End: 2025-07-01
Payer: COMMERCIAL

## 2025-07-01 VITALS
WEIGHT: 115 LBS | RESPIRATION RATE: 12 BRPM | SYSTOLIC BLOOD PRESSURE: 115 MMHG | TEMPERATURE: 97 F | BODY MASS INDEX: 21.16 KG/M2 | DIASTOLIC BLOOD PRESSURE: 85 MMHG | HEIGHT: 62 IN | OXYGEN SATURATION: 97 % | HEART RATE: 62 BPM

## 2025-07-01 DIAGNOSIS — R52 PAIN MANAGEMENT: ICD-10-CM

## 2025-07-01 PROCEDURE — 6360000002 HC RX W HCPCS: Performed by: STUDENT IN AN ORGANIZED HEALTH CARE EDUCATION/TRAINING PROGRAM

## 2025-07-01 PROCEDURE — 99152 MOD SED SAME PHYS/QHP 5/>YRS: CPT | Performed by: STUDENT IN AN ORGANIZED HEALTH CARE EDUCATION/TRAINING PROGRAM

## 2025-07-01 PROCEDURE — 64493 INJ PARAVERT F JNT L/S 1 LEV: CPT

## 2025-07-01 PROCEDURE — 64494 INJ PARAVERT F JNT L/S 2 LEV: CPT | Performed by: STUDENT IN AN ORGANIZED HEALTH CARE EDUCATION/TRAINING PROGRAM

## 2025-07-01 PROCEDURE — 64493 INJ PARAVERT F JNT L/S 1 LEV: CPT | Performed by: STUDENT IN AN ORGANIZED HEALTH CARE EDUCATION/TRAINING PROGRAM

## 2025-07-01 PROCEDURE — 64494 INJ PARAVERT F JNT L/S 2 LEV: CPT

## 2025-07-01 RX ORDER — LIDOCAINE HYDROCHLORIDE 20 MG/ML
INJECTION, SOLUTION EPIDURAL; INFILTRATION; INTRACAUDAL; PERINEURAL
Status: COMPLETED | OUTPATIENT
Start: 2025-07-01 | End: 2025-07-01

## 2025-07-01 RX ORDER — MIDAZOLAM HYDROCHLORIDE 2 MG/2ML
INJECTION, SOLUTION INTRAMUSCULAR; INTRAVENOUS
Status: COMPLETED | OUTPATIENT
Start: 2025-07-01 | End: 2025-07-01

## 2025-07-01 RX ADMIN — MIDAZOLAM HYDROCHLORIDE 2 MG: 1 INJECTION, SOLUTION INTRAMUSCULAR; INTRAVENOUS at 08:52

## 2025-07-01 RX ADMIN — LIDOCAINE HYDROCHLORIDE 5 ML: 20 INJECTION, SOLUTION EPIDURAL; INFILTRATION; INTRACAUDAL; PERINEURAL at 08:54

## 2025-07-01 ASSESSMENT — PAIN DESCRIPTION - LOCATION
LOCATION_2: BACK
LOCATION: NECK

## 2025-07-01 ASSESSMENT — PAIN DESCRIPTION - PAIN TYPE: TYPE: CHRONIC PAIN

## 2025-07-01 ASSESSMENT — PAIN DESCRIPTION - DESCRIPTORS
DESCRIPTORS_2: ACHING
DESCRIPTORS: SHARP;TIGHTNESS

## 2025-07-01 ASSESSMENT — PAIN DESCRIPTION - ONSET: ONSET: ON-GOING

## 2025-07-01 ASSESSMENT — PAIN DESCRIPTION - DIRECTION: RADIATING_TOWARDS: BILAT SHOULDERS

## 2025-07-01 ASSESSMENT — PAIN DESCRIPTION - ORIENTATION
ORIENTATION_2: LOWER
ORIENTATION: MID;LOWER

## 2025-07-01 ASSESSMENT — PAIN DESCRIPTION - FREQUENCY: FREQUENCY: CONTINUOUS

## 2025-07-01 ASSESSMENT — PAIN - FUNCTIONAL ASSESSMENT
PAIN_FUNCTIONAL_ASSESSMENT: PREVENTS OR INTERFERES SOME ACTIVE ACTIVITIES AND ADLS
PAIN_FUNCTIONAL_ASSESSMENT_SITE2: ACTIVITIES ARE NOT PREVENTED
PAIN_FUNCTIONAL_ASSESSMENT: NONE - DENIES PAIN

## 2025-07-01 ASSESSMENT — PAIN DESCRIPTION - INTENSITY: RATING_2: 4

## 2025-07-01 ASSESSMENT — PAIN SCALES - GENERAL: PAINLEVEL_OUTOF10: 7

## 2025-07-01 NOTE — INTERVAL H&P NOTE
Update History & Physical    The patient's History and Physical of June 6, 2025 was reviewed with the patient and I examined the patient. There was no change. The surgical site was confirmed by the patient and me.     Plan: The risks, benefits, expected outcome, and alternative to the recommended procedure have been discussed with the patient. Patient understands and wants to proceed with the procedure.     ASA CLASSIFICATION  2  MP   CLASSIFICATION  2    Electronically signed by Joey Robertson DO on 7/1/2025 at 8:24 AM

## 2025-07-01 NOTE — DISCHARGE INSTRUCTIONS
You have received a sedative/anesthetic therefore you should not consume any alcoholic beverages for 24 hours.  Do not drive or operate machinery for 24 hours.    Do not take a tub bath for 72 hours after procedure (this includes hot tubs).  You may shower, but avoid hot water to injection site.   Avoid strenuous activity TODAY especially if you experience dizziness.   Remove band-aid the next day.    Wash off any residual iodine 24 hours from today.   Do not use heat, heating pad, or any other heating device over the injection site for 3 days after the procedure.    If you experience pain after your procedure, you may continue with your current pain medication as prescribed.  (DO NOT INCREASE YOUR PAIN MEDICATION WITHOUT TALKING TO DOCTOR)  Soreness and pain at injection site is common, may use ice to reduce soreness.    Please complete pain diary as instructed. Office staff will contact you to review results.    Call [facility] at [phone] if you experience:   Fever, chills or temperature over 100    Vomiting, headache, persistent stiff neck, nausea or blurred vision   Difficulty urinating or unable to urinate within 8 hours   Increase in weakness, numbness or loss of function of limbs  Increased redness, swelling or drainage at the injection site

## 2025-07-01 NOTE — OP NOTE
PROCEDURE PERFORMED: Bilateral Lumbar medial branch block    PREOPERATIVE DIAGNOSIS: Lumbosacral spondylosis    INDICATIONS: Chronic low back pain    The patient's history and physical exam were reviewed.  The risk, benefits, and alternatives of the procedure were discussed and all questions were answered to the patient's satisfaction.  The patient agreed to proceed and written informed consent was obtained.    POSTOPERATIVE DIAGNOSIS: Lumbar spondylosis    PHYSICIAN:  Dr. Joey Robertson DO    ANESTHESIA:  LOCAL    ASSISTANT:  NONE    PATHOLOGY:  NONE    ESTIMATED BLOOD LOSS:  N/A    IMPLANTS:  NONE    PROCEDURE DESCRIPTION: Diagnostic bilateral lumbar medial branch block using fluoroscopy    The patient was placed on the operative bed in prone position.  The area was prepped with  Chlorhexidine.  The area was then draped in a sterile fashion.    Targeted levels: Bilateral lumbar L3, L4, L5 medial branch block    An AP  fluoroscopy image was used to identify and kwame Swain's point at the L3, L4 levels on the targeted side.  Additionally, the junction of the SAP and sacral ala was also marked on the same side.   A 25-gauge 3-1/2 inch Quincke spinal needle was then advanced toward each of these points under fluoroscopic guidance.  Once bone was contacted, negative aspiration was confirmed and 0.5 mL of lidocaine 2% was injected each level.  The needles were removed and the needle sites were dressed appropriately. The same procedure was performed on the opposite side.    The patient was transferred to the postoperative care unit in stable condition.  Written discharge instructions were given to the patient.  The patient was given a pain diary upon discharge.    COMPLICATIONS:  There were no apparent complications.  The patient tolerated the procedure well.     SEDATION NOTE:     ASA CLASSIFICATION  2  MP   CLASSIFICATION  2     Moderate intravenous conscious sedation was supervised by Dr. Robertson  The patient

## 2025-07-02 ENCOUNTER — TELEPHONE (OUTPATIENT)
Dept: PAIN MANAGEMENT | Age: 55
End: 2025-07-02

## 2025-07-02 NOTE — TELEPHONE ENCOUNTER
I called patient to go over Pain Diary questions    Pain before block:  \"5\"  Pain after block:  \"0\"  Activities:  \"Exercised for an hour, walked\"  Percent Relief:  \"100%\"    Confirmatory MBB scheduled 7/15.

## 2025-07-08 ENCOUNTER — OFFICE VISIT (OUTPATIENT)
Dept: NEUROSURGERY | Age: 55
End: 2025-07-08
Payer: COMMERCIAL

## 2025-07-08 VITALS
BODY MASS INDEX: 20.54 KG/M2 | DIASTOLIC BLOOD PRESSURE: 93 MMHG | HEART RATE: 63 BPM | WEIGHT: 111.6 LBS | HEIGHT: 62 IN | SYSTOLIC BLOOD PRESSURE: 134 MMHG

## 2025-07-08 DIAGNOSIS — M47.812 CERVICAL SPONDYLOSIS: Primary | ICD-10-CM

## 2025-07-08 DIAGNOSIS — M47.816 LUMBAR SPONDYLOSIS: ICD-10-CM

## 2025-07-08 PROCEDURE — 3017F COLORECTAL CA SCREEN DOC REV: CPT | Performed by: NEUROLOGICAL SURGERY

## 2025-07-08 PROCEDURE — 1036F TOBACCO NON-USER: CPT | Performed by: NEUROLOGICAL SURGERY

## 2025-07-08 PROCEDURE — G8420 CALC BMI NORM PARAMETERS: HCPCS | Performed by: NEUROLOGICAL SURGERY

## 2025-07-08 PROCEDURE — G8427 DOCREV CUR MEDS BY ELIG CLIN: HCPCS | Performed by: NEUROLOGICAL SURGERY

## 2025-07-08 PROCEDURE — 99214 OFFICE O/P EST MOD 30 MIN: CPT | Performed by: NEUROLOGICAL SURGERY

## 2025-07-08 NOTE — PROGRESS NOTES
Department of Neurosurgery                                                      Follow up visit      History Obtained From:  patient, family member - daughter    CHIEF COMPLAINT:         Chief Complaint   Patient presents with    Follow-up     check out comments: DEXA Scan   Cervical Xray   Lumbar Xray   Referral pain management   Follow up with surgeon          HISTORY OF PRESENT ILLNESS:       The patient is a 55 y.o. female who presents for follow up for neck and back pain. C/o of neck tightness jas in the morning. After a while it gets better. She had done physical therapy 12 session this year and 12 session a year ago. She doesn't feel that is effect. She feels yoga help a lit. She has tried steroid. 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 She denies radiating pain down arm or legs.     PAST MEDICAL HISTORY :       Past Medical History:    History reviewed. No pertinent past medical history.    Past Surgical History:        Procedure Laterality Date    TUBAL LIGATION         Social History:   Social History     Socioeconomic History    Marital status: Single     Spouse name: Not on file    Number of children: Not on file    Years of education: Not on file    Highest education level: Not on file   Occupational History    Not on file   Tobacco Use    Smoking status: Never    Smokeless tobacco: Never   Vaping Use    Vaping status: Never Used   Substance and Sexual Activity    Alcohol use: No    Drug use: No    Sexual activity: Yes     Partners: Male   Other Topics Concern    Not on file   Social History Narrative    Not on file     Social Drivers of Health     Financial Resource Strain: Not on file   Food Insecurity: Not on file   Transportation Needs: Not on file   Physical Activity: Not on file   Stress: Not on file   Social Connections: Not on file   Intimate Partner Violence: Unknown (2/22/2024)    [facility]

## 2025-07-15 ENCOUNTER — HOSPITAL ENCOUNTER (OUTPATIENT)
Dept: PAIN MANAGEMENT | Facility: CLINIC | Age: 55
Discharge: HOME | End: 2025-07-15
Payer: COMMERCIAL

## 2025-07-15 VITALS
OXYGEN SATURATION: 97 % | DIASTOLIC BLOOD PRESSURE: 99 MMHG | BODY MASS INDEX: 20.43 KG/M2 | RESPIRATION RATE: 10 BRPM | WEIGHT: 111 LBS | TEMPERATURE: 97.9 F | HEIGHT: 62 IN | HEART RATE: 63 BPM | SYSTOLIC BLOOD PRESSURE: 140 MMHG

## 2025-07-15 DIAGNOSIS — R52 PAIN MANAGEMENT: ICD-10-CM

## 2025-07-15 PROBLEM — M47.817 LUMBOSACRAL SPONDYLOSIS WITHOUT MYELOPATHY: Status: ACTIVE | Noted: 2025-07-15

## 2025-07-15 PROCEDURE — 64493 INJ PARAVERT F JNT L/S 1 LEV: CPT | Performed by: STUDENT IN AN ORGANIZED HEALTH CARE EDUCATION/TRAINING PROGRAM

## 2025-07-15 PROCEDURE — 64494 INJ PARAVERT F JNT L/S 2 LEV: CPT

## 2025-07-15 PROCEDURE — 64494 INJ PARAVERT F JNT L/S 2 LEV: CPT | Performed by: STUDENT IN AN ORGANIZED HEALTH CARE EDUCATION/TRAINING PROGRAM

## 2025-07-15 PROCEDURE — 6360000002 HC RX W HCPCS: Performed by: STUDENT IN AN ORGANIZED HEALTH CARE EDUCATION/TRAINING PROGRAM

## 2025-07-15 PROCEDURE — 64493 INJ PARAVERT F JNT L/S 1 LEV: CPT

## 2025-07-15 RX ORDER — LIDOCAINE HYDROCHLORIDE 20 MG/ML
INJECTION, SOLUTION EPIDURAL; INFILTRATION; INTRACAUDAL; PERINEURAL
Status: COMPLETED | OUTPATIENT
Start: 2025-07-15 | End: 2025-07-15

## 2025-07-15 RX ADMIN — LIDOCAINE HYDROCHLORIDE 5 ML: 20 INJECTION, SOLUTION EPIDURAL; INFILTRATION; INTRACAUDAL; PERINEURAL at 09:41

## 2025-07-15 ASSESSMENT — PAIN SCALES - GENERAL
PAINLEVEL_OUTOF10: 4
PAINLEVEL_OUTOF10: 2

## 2025-07-15 ASSESSMENT — PAIN - FUNCTIONAL ASSESSMENT
PAIN_FUNCTIONAL_ASSESSMENT_SITE2: PREVENTS OR INTERFERES SOME ACTIVE ACTIVITIES AND ADLS
PAIN_FUNCTIONAL_ASSESSMENT: ACTIVITIES ARE NOT PREVENTED

## 2025-07-15 ASSESSMENT — PAIN DESCRIPTION - ORIENTATION
ORIENTATION: LOWER
ORIENTATION_2: MID;RIGHT;LEFT

## 2025-07-15 ASSESSMENT — PAIN DESCRIPTION - FREQUENCY: FREQUENCY: CONTINUOUS

## 2025-07-15 ASSESSMENT — PAIN DESCRIPTION - INTENSITY: RATING_2: 7

## 2025-07-15 ASSESSMENT — PAIN DESCRIPTION - ONSET: ONSET: ON-GOING

## 2025-07-15 ASSESSMENT — PAIN DESCRIPTION - LOCATION
LOCATION_2: NECK
LOCATION: BACK

## 2025-07-15 ASSESSMENT — PAIN DESCRIPTION - DESCRIPTORS
DESCRIPTORS: SHARP
DESCRIPTORS_2: TIGHTNESS

## 2025-07-15 ASSESSMENT — PAIN DESCRIPTION - PAIN TYPE: TYPE: CHRONIC PAIN

## 2025-07-15 NOTE — H&P
Pain Pre-Op H&P Note    SUBJECTIVE:  No chief complaint on file.      History of Present Illness:   Skip Lynne is a 55 y.o. female who presents with low back pain.    History reviewed. No pertinent past medical history.    Past Surgical History:   Procedure Laterality Date    TUBAL LIGATION         History reviewed. No pertinent family history.    Social History     Socioeconomic History    Marital status: Single     Spouse name: Not on file    Number of children: Not on file    Years of education: Not on file    Highest education level: Not on file   Occupational History    Not on file   Tobacco Use    Smoking status: Never    Smokeless tobacco: Never   Vaping Use    Vaping status: Never Used   Substance and Sexual Activity    Alcohol use: No    Drug use: No    Sexual activity: Yes     Partners: Male   Other Topics Concern    Not on file   Social History Narrative    Not on file     Social Drivers of Health     Financial Resource Strain: Not on file   Food Insecurity: Not on file   Transportation Needs: Not on file   Physical Activity: Not on file   Stress: Not on file   Social Connections: Not on file   Intimate Partner Violence: Unknown (2/22/2024)    [facility] from [facility]     Fear of Current or Ex-Partner: Not on file     Emotionally Abused: Not on file     Physically Abused: Not on file     Sexually Abused: Not on file     Physically or Sexually Abused: Not on file   Housing Stability: Not on file       Medications & Allergies:   Current Outpatient Medications   Medication Instructions    acetaminophen (TYLENOL) 650 mg, 3 TIMES DAILY    ASPERFLEX PAIN RELIEVING 4 % external patch     lidocaine (LIDODERM) 5 % 1 patch, DAILY    LYCOPENE PO 1 tablet, DAILY    meloxicam (MOBIC) 7.5 mg, DAILY    Multiple Vitamin (MULTI-VITAMIN) TABS 1 tablet, DAILY    valACYclovir (VALTREX) 1 g tablet        No Known Allergies    Review of Systems:   Focused review

## 2025-07-15 NOTE — OP NOTE
PROCEDURE PERFORMED: Bilateral Lumbar medial branch block    PREOPERATIVE DIAGNOSIS: Lumbosacral spondylosis    INDICATIONS: Chronic low back pain    The patient's history and physical exam were reviewed.  The risk, benefits, and alternatives of the procedure were discussed and all questions were answered to the patient's satisfaction.  The patient agreed to proceed and written informed consent was obtained.    POSTOPERATIVE DIAGNOSIS: Lumbar spondylosis    PHYSICIAN:  Dr. Joey Robertson DO    ANESTHESIA:  LOCAL    ASSISTANT:  NONE    PATHOLOGY:  NONE    ESTIMATED BLOOD LOSS:  N/A    IMPLANTS:  NONE    PROCEDURE DESCRIPTION: Diagnostic bilateral lumbar medial branch block using fluoroscopy    The patient was placed on the operative bed in prone position.  The area was prepped with  Chlorhexidine.  The area was then draped in a sterile fashion.    Targeted levels: Bilateral lumbar L3, L4, L5 medial branch block    An AP  fluoroscopy image was used to identify and kwame Swain's point at the L3, L4 levels on the targeted side.  Additionally, the junction of the SAP and sacral ala was also marked on the same side.   A 25-gauge 3-1/2 inch Quincke spinal needle was then advanced toward each of these points under fluoroscopic guidance.  Once bone was contacted, negative aspiration was confirmed and 0.5 mL of lidocaine 2% was injected each level.  The needles were removed and the needle sites were dressed appropriately. The same procedure was performed on the opposite side.    The patient was transferred to the postoperative care unit in stable condition.  Written discharge instructions were given to the patient.  The patient was given a pain diary upon discharge.    COMPLICATIONS:  There were no apparent complications.  The patient tolerated the procedure well.

## 2025-07-15 NOTE — DISCHARGE INSTRUCTIONS
You have received a sedative/anesthetic therefore you should not consume any alcoholic beverages for 24 hours.  Do not drive or operate machinery for 24 hours.    Do not take a tub bath for 72 hours after procedure (this includes hot tubs).  You may shower, but avoid hot water to injection site.   Avoid strenuous activity TODAY especially if you experience dizziness.   Remove band-aid the next day.    Wash off any residual iodine 24 hours from today.   Do not use heat, heating pad, or any other heating device over the injection site for 3 days after the procedure.    If you experience pain after your procedure, you may continue with your current pain medication as prescribed.  (DO NOT INCREASE YOUR PAIN MEDICATION WITHOUT TALKING TO DOCTOR)  Soreness and pain at injection site is common, may use ice to reduce soreness.    Please complete pain diary as instructed. Office staff will contact you to review results.    Call [facility] at [phone] if you experience:   Fever, chills or temperature over 100    Vomiting, headache, persistent stiff neck, nausea or blurred vision   Difficulty urinating or unable to urinate within 8 hours   Increase in weakness, numbness or loss of function of limbs  Increased redness, swelling or drainage at the injection site

## 2025-07-21 ENCOUNTER — TELEPHONE (OUTPATIENT)
Dept: PAIN MANAGEMENT | Age: 55
End: 2025-07-21

## 2025-07-21 DIAGNOSIS — M51.369 DEGENERATION OF INTERVERTEBRAL DISC OF LUMBAR REGION, UNSPECIFIED WHETHER PAIN PRESENT: ICD-10-CM

## 2025-07-21 DIAGNOSIS — M47.817 LUMBOSACRAL SPONDYLOSIS WITHOUT MYELOPATHY: Primary | ICD-10-CM

## 2025-07-21 NOTE — TELEPHONE ENCOUNTER
S/P: Bilateral Lumbar medial branch block       DOS: 07/15/25    Pain  before procedure with activity : 4    Pain after procedure with activity: 0    Activities following procedure include: Walked around and cooked lunch, grocery shopping    % of pain relief: 100%    Procedure successful: Yes    OR scheduled: 08/19/25

## 2025-08-19 ENCOUNTER — HOSPITAL ENCOUNTER (OUTPATIENT)
Dept: PAIN MANAGEMENT | Facility: CLINIC | Age: 55
Discharge: HOME | End: 2025-08-19
Payer: COMMERCIAL

## 2025-08-19 VITALS
RESPIRATION RATE: 12 BRPM | BODY MASS INDEX: 20.42 KG/M2 | WEIGHT: 104 LBS | OXYGEN SATURATION: 99 % | DIASTOLIC BLOOD PRESSURE: 100 MMHG | HEART RATE: 60 BPM | TEMPERATURE: 97.5 F | HEIGHT: 60 IN | SYSTOLIC BLOOD PRESSURE: 147 MMHG

## 2025-08-19 DIAGNOSIS — R52 PAIN MANAGEMENT: ICD-10-CM

## 2025-08-19 PROCEDURE — 64636 DESTROY L/S FACET JNT ADDL: CPT

## 2025-08-19 PROCEDURE — 6360000002 HC RX W HCPCS: Performed by: STUDENT IN AN ORGANIZED HEALTH CARE EDUCATION/TRAINING PROGRAM

## 2025-08-19 PROCEDURE — 64635 DESTROY LUMB/SAC FACET JNT: CPT

## 2025-08-19 RX ORDER — LIDOCAINE HYDROCHLORIDE 10 MG/ML
INJECTION, SOLUTION EPIDURAL; INFILTRATION; INTRACAUDAL; PERINEURAL
Status: COMPLETED | OUTPATIENT
Start: 2025-08-19 | End: 2025-08-19

## 2025-08-19 RX ORDER — DEXAMETHASONE SODIUM PHOSPHATE 10 MG/ML
INJECTION, SOLUTION INTRAMUSCULAR; INTRAVENOUS
Status: COMPLETED | OUTPATIENT
Start: 2025-08-19 | End: 2025-08-19

## 2025-08-19 RX ORDER — LIDOCAINE HYDROCHLORIDE 20 MG/ML
INJECTION, SOLUTION EPIDURAL; INFILTRATION; INTRACAUDAL; PERINEURAL
Status: COMPLETED | OUTPATIENT
Start: 2025-08-19 | End: 2025-08-19

## 2025-08-19 RX ADMIN — LIDOCAINE HYDROCHLORIDE 5 ML: 20 INJECTION, SOLUTION EPIDURAL; INFILTRATION; INTRACAUDAL; PERINEURAL at 08:58

## 2025-08-19 RX ADMIN — DEXAMETHASONE SODIUM PHOSPHATE 10 MG: 10 INJECTION INTRAMUSCULAR; INTRAVENOUS at 09:02

## 2025-08-19 RX ADMIN — LIDOCAINE HYDROCHLORIDE 2 ML: 10 INJECTION, SOLUTION EPIDURAL; INFILTRATION; INTRACAUDAL; PERINEURAL at 09:02

## 2025-08-19 ASSESSMENT — PAIN SCALES - GENERAL: PAINLEVEL_OUTOF10: 2

## 2025-08-19 ASSESSMENT — PAIN - FUNCTIONAL ASSESSMENT
PAIN_FUNCTIONAL_ASSESSMENT: 0-10
PAIN_FUNCTIONAL_ASSESSMENT: PREVENTS OR INTERFERES SOME ACTIVE ACTIVITIES AND ADLS

## 2025-08-19 ASSESSMENT — PAIN DESCRIPTION - DESCRIPTORS: DESCRIPTORS: SORE

## 2025-08-26 ENCOUNTER — HOSPITAL ENCOUNTER (OUTPATIENT)
Dept: PAIN MANAGEMENT | Facility: CLINIC | Age: 55
Discharge: HOME | End: 2025-08-26
Payer: COMMERCIAL

## 2025-08-26 VITALS
WEIGHT: 104 LBS | DIASTOLIC BLOOD PRESSURE: 103 MMHG | HEIGHT: 60 IN | SYSTOLIC BLOOD PRESSURE: 157 MMHG | RESPIRATION RATE: 12 BRPM | HEART RATE: 66 BPM | OXYGEN SATURATION: 99 % | BODY MASS INDEX: 20.42 KG/M2 | TEMPERATURE: 97.8 F

## 2025-08-26 DIAGNOSIS — R52 PAIN MANAGEMENT: ICD-10-CM

## 2025-08-26 PROCEDURE — 64636 DESTROY L/S FACET JNT ADDL: CPT | Performed by: STUDENT IN AN ORGANIZED HEALTH CARE EDUCATION/TRAINING PROGRAM

## 2025-08-26 PROCEDURE — 64635 DESTROY LUMB/SAC FACET JNT: CPT | Performed by: STUDENT IN AN ORGANIZED HEALTH CARE EDUCATION/TRAINING PROGRAM

## 2025-08-26 PROCEDURE — 64636 DESTROY L/S FACET JNT ADDL: CPT

## 2025-08-26 PROCEDURE — 6360000002 HC RX W HCPCS: Performed by: STUDENT IN AN ORGANIZED HEALTH CARE EDUCATION/TRAINING PROGRAM

## 2025-08-26 PROCEDURE — 64635 DESTROY LUMB/SAC FACET JNT: CPT

## 2025-08-26 RX ORDER — LIDOCAINE HYDROCHLORIDE 20 MG/ML
INJECTION, SOLUTION EPIDURAL; INFILTRATION; INTRACAUDAL; PERINEURAL
Status: COMPLETED | OUTPATIENT
Start: 2025-08-26 | End: 2025-08-26

## 2025-08-26 RX ORDER — DEXAMETHASONE SODIUM PHOSPHATE 10 MG/ML
INJECTION, SOLUTION INTRAMUSCULAR; INTRAVENOUS
Status: COMPLETED | OUTPATIENT
Start: 2025-08-26 | End: 2025-08-26

## 2025-08-26 RX ORDER — CYCLOBENZAPRINE HCL 5 MG
TABLET ORAL
COMMUNITY
Start: 2025-07-14

## 2025-08-26 RX ORDER — ACETAMINOPHEN AND CODEINE PHOSPHATE 300; 30 MG/1; MG/1
TABLET ORAL
COMMUNITY
Start: 2025-07-15

## 2025-08-26 RX ORDER — LIDOCAINE HYDROCHLORIDE 10 MG/ML
INJECTION, SOLUTION EPIDURAL; INFILTRATION; INTRACAUDAL; PERINEURAL
Status: COMPLETED | OUTPATIENT
Start: 2025-08-26 | End: 2025-08-26

## 2025-08-26 RX ADMIN — LIDOCAINE HYDROCHLORIDE 2 ML: 10 INJECTION, SOLUTION EPIDURAL; INFILTRATION; INTRACAUDAL; PERINEURAL at 09:41

## 2025-08-26 RX ADMIN — DEXAMETHASONE SODIUM PHOSPHATE 10 MG: 10 INJECTION INTRAMUSCULAR; INTRAVENOUS at 09:41

## 2025-08-26 RX ADMIN — LIDOCAINE HYDROCHLORIDE 5 ML: 20 INJECTION, SOLUTION EPIDURAL; INFILTRATION; INTRACAUDAL; PERINEURAL at 09:36

## 2025-08-26 ASSESSMENT — PAIN DESCRIPTION - DESCRIPTORS: DESCRIPTORS: SORE

## 2025-08-26 ASSESSMENT — PAIN - FUNCTIONAL ASSESSMENT: PAIN_FUNCTIONAL_ASSESSMENT: 0-10

## 2025-08-26 ASSESSMENT — PAIN SCALES - GENERAL: PAINLEVEL_OUTOF10: 0
